# Patient Record
Sex: FEMALE | Race: BLACK OR AFRICAN AMERICAN | NOT HISPANIC OR LATINO | URBAN - METROPOLITAN AREA
[De-identification: names, ages, dates, MRNs, and addresses within clinical notes are randomized per-mention and may not be internally consistent; named-entity substitution may affect disease eponyms.]

---

## 2023-10-09 ENCOUNTER — OFFICE VISIT (OUTPATIENT)
Dept: URBAN - METROPOLITAN AREA CLINIC 48 | Facility: CLINIC | Age: 38
End: 2023-10-09
Payer: COMMERCIAL

## 2023-10-09 VITALS
HEIGHT: 59 IN | TEMPERATURE: 97 F | DIASTOLIC BLOOD PRESSURE: 80 MMHG | BODY MASS INDEX: 39.68 KG/M2 | HEART RATE: 65 BPM | SYSTOLIC BLOOD PRESSURE: 123 MMHG | WEIGHT: 196.8 LBS

## 2023-10-09 DIAGNOSIS — K58.1 IRRITABLE BOWEL SYNDROME WITH CONSTIPATION: ICD-10-CM

## 2023-10-09 PROBLEM — 440630006: Status: ACTIVE | Noted: 2023-10-09

## 2023-10-09 PROCEDURE — 99204 OFFICE O/P NEW MOD 45 MIN: CPT | Performed by: INTERNAL MEDICINE

## 2023-10-09 RX ORDER — TENAPANOR HYDROCHLORIDE 53.2 MG/1
1 TABLET IMMEDIATELY BEFORE MEALS TABLET ORAL TWICE A DAY
Qty: 60 | OUTPATIENT
Start: 2023-10-15 | End: 2023-11-13

## 2023-10-09 RX ORDER — ELVITEGRAVIR, COBICISTAT, EMTRICITABINE, AND TENOFOVIR ALAFENAMIDE 150; 150; 200; 10 MG/1; MG/1; MG/1; MG/1
AS DIRECTED TABLET ORAL
Status: ACTIVE | COMMUNITY

## 2023-10-09 NOTE — HPI-TODAY'S VISIT:
38 F presents for evaluation of changes in bowel habits. She mentions that she has irregular bowel habits with BMs occuring every 3-4 days with incomplete evaluation. She mentions Spencer 1 stools and sometimes 4-6. Admits to strainng on toilet with scant hematochezia with wiping that she attributes to hemorrhoids. Mentions abdominal pain and bloating. Denies weight loss. Maternal grandmother with CRC at 50.  Medication reconciliation: Genvoya Last ID visit was last month.

## 2023-10-16 ENCOUNTER — LAB OUTSIDE AN ENCOUNTER (OUTPATIENT)
Dept: URBAN - METROPOLITAN AREA CLINIC 118 | Facility: CLINIC | Age: 38
End: 2023-10-16

## 2023-10-16 ENCOUNTER — DASHBOARD ENCOUNTERS (OUTPATIENT)
Age: 38
End: 2023-10-16

## 2023-10-16 ENCOUNTER — OFFICE VISIT (OUTPATIENT)
Dept: URBAN - METROPOLITAN AREA CLINIC 118 | Facility: CLINIC | Age: 38
End: 2023-10-16
Payer: COMMERCIAL

## 2023-10-16 ENCOUNTER — TELEPHONE ENCOUNTER (OUTPATIENT)
Dept: URBAN - METROPOLITAN AREA CLINIC 44 | Facility: CLINIC | Age: 38
End: 2023-10-16

## 2023-10-16 VITALS
SYSTOLIC BLOOD PRESSURE: 125 MMHG | TEMPERATURE: 97.9 F | WEIGHT: 194.6 LBS | BODY MASS INDEX: 41.98 KG/M2 | HEIGHT: 57 IN | DIASTOLIC BLOOD PRESSURE: 79 MMHG | HEART RATE: 93 BPM

## 2023-10-16 DIAGNOSIS — R14.0 ABDOMINAL BLOATING: ICD-10-CM

## 2023-10-16 DIAGNOSIS — K59.09 CHRONIC CONSTIPATION: ICD-10-CM

## 2023-10-16 DIAGNOSIS — K92.1 HEMATOCHEZIA: ICD-10-CM

## 2023-10-16 PROBLEM — 236069009: Status: ACTIVE | Noted: 2023-10-16

## 2023-10-16 PROBLEM — 449341000124102: Status: ACTIVE | Noted: 2023-10-16

## 2023-10-16 PROBLEM — 116289008: Status: ACTIVE | Noted: 2023-10-16

## 2023-10-16 PROCEDURE — 99214 OFFICE O/P EST MOD 30 MIN: CPT | Performed by: INTERNAL MEDICINE

## 2023-10-16 RX ORDER — BISACODYL 5 MG
3 TABLET, DELAYED RELEASE (ENTERIC COATED) ORAL
Qty: 3 | Refills: 0 | OUTPATIENT
Start: 2023-10-16 | End: 2023-10-17

## 2023-10-16 RX ORDER — ELVITEGRAVIR, COBICISTAT, EMTRICITABINE, AND TENOFOVIR ALAFENAMIDE 150; 150; 200; 10 MG/1; MG/1; MG/1; MG/1
AS DIRECTED TABLET ORAL
Status: ACTIVE | COMMUNITY

## 2023-10-16 RX ORDER — TENAPANOR HYDROCHLORIDE 53.2 MG/1
1 TABLET IMMEDIATELY BEFORE MEALS TABLET ORAL TWICE A DAY
Qty: 60 | Status: ACTIVE | COMMUNITY
Start: 2023-10-15 | End: 2023-11-13

## 2023-10-16 RX ORDER — POLYETHYLENE GLYCOL 3350, SODIUM SULFATE ANHYDROUS, SODIUM BICARBONATE, SODIUM CHLORIDE, POTASSIUM CHLORIDE 236; 22.74; 6.74; 5.86; 2.97 G/4L; G/4L; G/4L; G/4L; G/4L
4000 ML POWDER, FOR SOLUTION ORAL ONCE
Qty: 1 | Refills: 0 | OUTPATIENT
Start: 2023-10-16 | End: 2023-10-17

## 2023-10-16 NOTE — PHYSICAL EXAM CHEST:
Patient Education    rest, increase hydration, Tylenol alternating with Motrin for fever, avoid exposure to cold environment, take antibiotics as prescribed, monitor symptoms, follow-up.  Tonsillitis (Child)    Tonsillitis is an inflammation or infection of your child's tonsils. Your child has 2 tonsils, 1 on either side of his or her throat. The tonsils are large, oval glands. They help prevent infections. But tonsils can become infected themselves. Tonsillitis is a common childhood condition.  Tonsillitis can be caused by bacteria or a virus. The main symptom is a sore throat. Your child may also have a fever, throat redness or swelling, or trouble swallowing. The tonsils may also look white, gray, or yellow.  If your child has a bacterial infection, antibiotics may be prescribed. Antibiotics don’t work against viral infections. In some cases of a viral infection, an antiviral medicine may be prescribed. Once the problem has been treated, your child may need surgery to remove the tonsils if they become infected often or cause breathing problems.  Home care  If your child’s healthcare provider has prescribed antibiotics or another medicine, give it to your child as directed. Be sure your child finishes all of the medicine, even if he or she feels better.  To help ease your child’s sore throat:  · Give acetaminophen or ibuprofen. Follow the package instructions for giving these to a child. (Do not give aspirin to anyone younger than 18 years old who is ill with a fever. It may cause severe liver damage.)  · Offer cool liquids to drink.  · Have your child gargle with warm salt water. An over-the-counter throat-numbing spray may also help.  The germs that cause tonsillitis are very contagious. To help prevent their spread, follow these tips:  · Teach your child to wash his or her hands often.  · Don’t let your child share cups or utensils with other people.  · Keep your child away from other children until he or she  no lesions, no deformities, no traumatic injuries, no significant scars are present, chest wall non-tender, no masses present, breathing is unlabored without accessory muscle use,normal breath sounds is better.  Follow-up care  Follow up with your child's healthcare provider, or as advised.  When to seek medical advice  Unless advised otherwise, call your child's healthcare provider if:  · Your child is 3 months old or younger and has a fever of 100.4°F (38°C) or higher. Your child may need to see a healthcare provider.  · Your child is of any age and has fevers higher than 104°F (40°C) that come back again and again.  Also call if any of the following occur:  · Child has a sore throat for more than 2 days  · Child has a sore throat with fever, headache, stomachache, or rash  · Child has neck pain  · Child has a seizure  · Child is acting strangely  · Child has trouble swallowing or breathing  · Child can’t open his or her mouth fully  Date Last Reviewed: 10/1/2017  © 5373-2853 [employer]. [address] [place] [address], [place] [address]. All rights reserved. This information is not intended as a substitute for professional medical care. Always follow your healthcare professional's instructions.

## 2023-10-16 NOTE — PHYSICAL EXAM NECK/THYROID:
normal appearance , without tenderness upon palpation , no deformities , trachea midline , Thyroid normal size , no masses , thyroid nontender Partially impaired: cannot see medication labels or newsprint, but can see obstacles in path, and the surrounding layout; can count fingers at arm's length

## 2023-10-16 NOTE — HPI-TODAY'S VISIT:
The patient presents for f/u appt.  seen by GI 1 week ago for chronic constipation, was prescribed IBSrela, tried for a few days but had worsening abd discomfort, eary satiety and took about 3 days + Milk of Magnesium before having bm's.  reports occasional hematochezia.  no n/v, weight loss.  denies nsaid's.

## 2023-10-17 ENCOUNTER — TELEPHONE ENCOUNTER (OUTPATIENT)
Dept: URBAN - METROPOLITAN AREA CLINIC 46 | Facility: CLINIC | Age: 38
End: 2023-10-17

## 2023-10-25 ENCOUNTER — CLAIMS CREATED FROM THE CLAIM WINDOW (OUTPATIENT)
Dept: URBAN - METROPOLITAN AREA MEDICAL CENTER 16 | Facility: MEDICAL CENTER | Age: 38
End: 2023-10-25

## 2023-10-25 ENCOUNTER — CLAIMS CREATED FROM THE CLAIM WINDOW (OUTPATIENT)
Dept: URBAN - METROPOLITAN AREA MEDICAL CENTER 16 | Facility: MEDICAL CENTER | Age: 38
End: 2023-10-25
Payer: COMMERCIAL

## 2023-10-25 DIAGNOSIS — K59.09 CHANGE IN BOWEL MOVEMENTS INTERMITTENT CONSTIPATION. URGENCY IN THE MORNING.: ICD-10-CM

## 2023-10-25 DIAGNOSIS — K92.1 ACUTE MELENA: ICD-10-CM

## 2023-10-25 PROCEDURE — 45378 DIAGNOSTIC COLONOSCOPY: CPT | Performed by: INTERNAL MEDICINE

## 2023-10-25 RX ORDER — TENAPANOR HYDROCHLORIDE 53.2 MG/1
1 TABLET IMMEDIATELY BEFORE MEALS TABLET ORAL TWICE A DAY
Qty: 60 | Status: ACTIVE | COMMUNITY
Start: 2023-10-15 | End: 2023-11-13

## 2023-10-25 RX ORDER — ELVITEGRAVIR, COBICISTAT, EMTRICITABINE, AND TENOFOVIR ALAFENAMIDE 150; 150; 200; 10 MG/1; MG/1; MG/1; MG/1
AS DIRECTED TABLET ORAL
Status: ACTIVE | COMMUNITY

## 2023-11-20 ENCOUNTER — TELEPHONE ENCOUNTER (OUTPATIENT)
Dept: URBAN - METROPOLITAN AREA CLINIC 44 | Facility: CLINIC | Age: 38
End: 2023-11-20

## 2023-12-04 ENCOUNTER — OFFICE VISIT (OUTPATIENT)
Dept: URBAN - METROPOLITAN AREA CLINIC 48 | Facility: CLINIC | Age: 38
End: 2023-12-04